# Patient Record
(demographics unavailable — no encounter records)

---

## 2017-02-03 NOTE — STRESS TEST
PROCEDURE PHYSICIAN:   ADRIANNA PRUETT

 

PHARMACOLOGIC NUCLEAR STRESS TEST REPORT 

 

DATE OF PROCEDURE:  

02/02/2017

 

INDICATION: 

Chest pain, hypertension, shortness of breath

 

PROCEDURE DETAILS:

The patient was brought to the stress lab after informed consent

was taken. Pharmacological nuclear stress test was performed

according to the protocol. Lexiscan 0.4 mg was given according to

the protocol. The patient had sinus rhythm at 82 bpm. Blood

pressure was 131/78 mmHg.  A low grade exercise was performed

during Lexiscan infusion. Maximum heart rate was 106 bpm. Blood

pressure was 140/76 mmHg.  The patient did not have any chest

pain, arrhythmias, ST-T wave abnormalities. The stress test was

stopped secondary to completion of protocol. Radionuclear isotope

was given at peak vasodilatation. 10.62 mCi of Myoview were given

for rest images and 30.1 mCi of Myoview were given for stress

imaging. Review of myocardial perfusion imaging shows TID of

0.92. Ejection fraction is 66%. End-diastolic volume was 70 mL

and end-systolic volume of 24 mL.  The patient has a large

moderate intensity inferior apical reversible defect. On gaited

images shows normal wall motion. Some stress score is 4 and some

differential score is 4. 

 

CONCLUSION:

1.   Pharmacological nuclear stress test is negative for

ischemia. 

2.   There is evidence of anterior apical ischemia.  

3.   Clinical correlation is recommended. 

 

 

 

Job ID: [id number]

Dictated Date: 02/02/2017 15:24:05 

Transcription Date: 02/03/2017 10:16:16 / cori

## 2017-02-03 NOTE — ECHOCARDIOGRAPHY REPORT
PROCEDURE PHYSICIAN:   ADRIANNA PRUETT

 

DATE OF PROCEDURE:  

02/02/2017

 

TWO DIMENSIONAL ECHOCARDIOGRAM REPORT 

 

PRIMARY PHYSICIAN:       Dr. Shaikh

OTHER PHYSICIAN:         

REFERRING PHYSICIAN:          

ORDERING PHYSICIAN:      

ATTENDING PHYSICIAN:     

FAMILY PHYSICIAN:        

READING PHYSICIAN:            

 

INDICATION FOR THE PROCEDURE:  Chest pain, hypertension,

palpitations, shortness of breath

 

MEASUREMENTS                  DERIVED VALUES 

 

LV DIAMETER (LAX)   NORMALS                       NORMALS

Diastolic           (3.6-5.2)      Eject. Fract.  (60%+/-6%)    

  

Systolic            (2.3-3.9)      Diastolic Vol.      

% Shortening        (0.22-0.42)    Systolic Vol.       

                                   Aortic Root         

IVS THICKNESS 

Diastolic           (0.6-1.1)

 

LVPW THICKNESS 

Diastolic           (0.6-1.1)

 

LA DIAMETER 

Systolic            (2.1-3.7)  

 

FINDINGS:

1.   Sinus rhythm. 

2.   Left atrial size is normal. Left atrial diameter is 3.6 cm. 

3.   Aortic root dimensions are normal. 

4.   LV systolic function is preserved. LV ejection fraction is

60 to 65%. Mild concentric LVH is noted with diastolic

intraventricular septal diameter of 1.1 cm. 

5.   There is no wall motion abnormalities. 

6.   Right heart dimensions are normal. Right ventricular

systolic function is preserved. 

7.   There is no evidence of pericardial effusion. 

8.   Diastolic function is normal. 

9.   IVC is enlarged with a diameter of 2.0 centimeters with

normal respiratory variation. 

 

VALVULAR STRUCTURE OF THE HEART:  

 

The mitral valve leaflets are mildly thickened with mild mitral

regurgitation. 

There is mild tricuspid regurgitation with RVSP of 27 mmHg. 

The pulmonic valve is within normal limits. 

There is mild aortic sclerosis noted with mild aortic

regurgitation. Pressure half time is 531. 

 

CONCLUSION:

1.   LV and RV size and function is normal. 

2.   LV EF is normal. 

3.   There is no significant valvular heart disease. 

4.   PA pressure is borderline elevated. 

 

 

 

Job ID: [id number]

Dictated Date: 02/02/2017 15:01:36 

Transcription Date: 02/03/2017 09:59:43 / cori

## 2017-02-07 NOTE — PROGRESS NOTE-POST OPERATIVE
Post-Operative Progess Note


Pre-Operative Diagnosis


chest pain, risk factor for cad, abnormal EKG, abnormal MPI





Post-Operative Diagnosis





patent epicardial coronary arteries





Post-Op Procedure Note


Date of Procedure:  Feb 7, 2017


Name of Procedure:  


coronary angiography


Procedure Note/Findings


coronary angiography revealed patent epicardial coronary vessels.


Anesthesia Type


local anesthesia, conscious sedation


Estimated blood loss (mL):  10 mL


Packing:  


none


Specimen(s) collected


none








MICHELLE PRUETT MD Feb 7, 2017 5:17 pm

## 2017-02-07 NOTE — CARDIAC PROCEDURE NOTE-CS/ASA
Pre-Procedure Note


Pre-Op Procedure Note


H&P Reviewed


The H&P was reviewed, patient examined and no changes noted.


Date H&P Reviewed:  Feb 7, 2017


Time H&P Reviewed:  15:40





Conscious Sedation Pre-Proced


Time Reviewed:  15:40


ASA Class:  3











Airway Mallampati Classification: ([place] appropriate class) I.  II.  III,  IV


 


Lungs 


 


Heart 


 


 ASA score


 


 ASA 1: a normal healthy patient


 


 ASA 2:  a patient with a mild systemic disease (mid diabetes, controlled 

hypertension, obesity 


 


 ASA 3:  a patient with a severe systemic disease that limits activity  (angina

, COPD, prior Myocardial infarction)


 


 ASA 4:  a patient with an incapacitating disease that is a constant threat to 

life (CHF, renal failure)


 


 ASA 5:  a moribund patient not expected to survive 24 hrs.  (ruptured aneurysm)


 


 ASA 6:  a declared brain dead patient whose organs are being harvested.


 


 For emergent operations, add the letter E after the classification








Grade 1


Sedation Plan:  Analgesia, Amnesia, Plan communicated to team members, 

Discussed options with patient/fam, Discussed risks with patient/fam


Note


The patient is an appropriate candidate to undergo the planned procedure, 

sedation, and anesthesia.





The patient immediately re-assessed prior to indication.








MICHELLE PRUETT MD Feb 7, 2017 3:40 pm

## 2017-02-07 NOTE — CARDIOLOGY DISCHARGE SUMMARY
Diagnosis/Chief Complaint


Date of Admission


2/7/2017


Date of Discharge


2/7/2017


Admission Diagnosis


chest pain, abnormal nuclear stress test





Final/Discharge Diagnosis


patent epicardial coronary arteries





Chief Complaint/HPI


Chief Complaint/HPI


chest pain, abnormal EKG,  and abnormal myocardial perfusion imaging, risk 

factors for coronary artery disease.





Discharge Summary


Procedures


coronary angiography


Discharge Physical Examination


stable





Hospital Course


stable


Pending Labs


Laboratory Tests


2/7/17 13:51: 


Activated Partial Thromboplast Time 25, Alanine Aminotransferase (ALT/SGPT) 30, 

Albumin 4.3, Alkaline Phosphatase 63, Anion Gap 7, Aspartate Amino Transf (AST/

SGOT) 19, BUN/Creatinine Ratio 14, Blood Urea Nitrogen 12, Calcium Level 8.9, 

Carbon Dioxide Level 28, Chloride Level 101, Creatinine 0.86, Estimat 

Glomerular Filtration Rate > 60, Glucose Level 102, Hematocrit 39, Hemoglobin 

13.5, INR Comment 1.0, Mean Corpuscular Hemoglobin 33, Mean Corpuscular 

Hemoglobin Concent 35, Mean Corpuscular Volume 95, Mean Platelet Volume 9.7, 

Platelet Count 314, Potassium Level 3.7, Prothrombin Time 12.7, Red Blood Count 

4.11, Red Cell Distribution Width 12.6, Sodium Level 136, Total Bilirubin 0.4, 

Total Protein 6.7, White Blood Count 10.6





Discussion & Recommendations


Discussion


patent epicardial coronary vessels.  Continue primary prevention measures.  

Continue treatment for hypertension and hyperlipidemia.


Follow up appt.:  


Dr. Copeland in 2-3 weeks


Dicharge Diet:  Cardiac Diet


Activity as Tolerated:  Yes (can go back to work on coming Monday)


Home Medications


Reviewed patient Home Medication Reconciliation Form





Discharge


Home Medications:


Reviewed and agree with Discharge Medication list on patient's Discharge 

Instruction sheet


Condition at discharge


stable


Instructions to patient/family


Dr Copeland in 2-3 weeks








MICHELLE COPELAND MD Feb 7, 2017 5:21 pm

## 2017-02-07 NOTE — DISCHARGE INST-POST CATH
Discharge Inst-CATH


Post Cardiac Cath D/C Inst


Follow Up/Plan


Dr Copeland in 2-3 weeks


CARDIAC CATH DISCHARGE INSTRUCTIONS





*Hold Metformin for 48 hours post heart cath.





ACTIVITY





* Go Home directly and rest.


* Limit activity of the leg (or wrist if it was used) for 7 days including 

aerobics, swimming,


   jogging, bicycling, etc.


* Restrict stair-climbing for 7 days if possible, if not, climb up with your non

-cath leg, then


   bring together on the same step.


* Avoid lifting, pushing, pulling or excessive movement of the affected 

extremity for 7 days.


* Customary sexual activity may be resumed after 2 days-use caution not to use 

a position  


   that strains or causes pain to the affected extremity.


* No driving for 24 hours.


* NO SMOKING. 


* Avoid straining for bowel movements for 7 days.


* Gentle walking on level ground is allowed.


* Returning to work will depend on the type of procedure and the results. Your 

doctor will discuss


   this with you.





CALL YOUR DOCTOR FOR ANY OF THE FOLLOWING:





*If bleeding from the puncture site occurs- Apply gentle pressure to site with 

clean cloth and call


   your doctor or EMS.


* If a knot or lump forms under the skin, increases in size, or causes pain.


* If bruising appears to be worsening or moving further down your leg instead 

of disappearing.


* Temperature above 101 F.





CARE OF YOUR GROIN INCISION;





* Bruising or purple discoloration of the skin near the puncture site is common.


* You may shower only, no bathtub bathing for 5 days.  Be careful to avoid 

slipping as your


   leg may feel stiff.


* If a closure device was used on your femoral artery, please see the attached 

guide regarding


   care of the device and your leg.


* REMOVE the dressing from your groin the next day after your procedure in the 

shower.





CARE OF YOUR WRIST INCISION;





* Bruising or purple discoloration of the skin near the puncture site is common.


* You may shower.


* DO NOT submerge wrist.


* Remove dressing in 24 hours.








MICHELLE COPELAND MD Feb 7, 2017 5:18 pm

## 2017-02-08 NOTE — CARDIAC CATHETERIZATION
PROCEDURE PHYSICIAN:   ADRIANNA PRUETT

 

DATE OF PROCEDURE:  

02/07/2017

 

INDICATION:

1.   Chest pain.

2.   EKG changes.

3.   Abnormal nuclear stress test. 

 

PREOPERATIVE DIAGNOSIS:

1.   Chest pain.

2.   Risk factors for coronary artery disease. 

3.   Abnormal EKG. 

4.   Abnormal nuclear stress test. 

 

POSTOPERATIVE DIAGNOSIS:

Patent epicardial coronary disease. 

 

HISTORY:

This is a 44-year-old lady with history of hypertension and

hyperlipidemia. She presents with recurrent chest pain. EKG shows

Q waves in the lateral leads, as well as abnormal R wave progression

in the anterior precordial leads. No ST-T wave abnormalities were

noted. Nuclear myocardial perfusion imaging was recommended which

showed at least a moderate sized reversible defect in the

anterior interior apical wall. Therefore coronary angiography was

recommended. 

 

PROCEDURE PERFORMED:

Coronary angiography. 

 

SPECIMENS:

None. 

 

COMPLICATIONS:

None. 

 

ESTIMATED BLOOD LOSS:

And 10 mL. 

 

CONTRAST:  36 mL of Omnipaque  

 

FLUOROSCOPY TIME:   5.8 minutes

 

FLUOROSCOPY DOSE:   553 mGy. 

 

PROCEDURE DETAILS:

The patient was brought to the Cath Lab after informed consent

was taken. She was  draped and prepped in the usual sterile

fashion.  Access was gained in the right radial artery with a 6

Chinese sheath.  Coronary angiography was performed with a Alban

catheter. 

 

FINDINGS:

1.   Left main:  Patent. 

2.   LAD: Patent; it is a transapical vessel. 

3.   Left circumflex artery: Patent. 

4.   RCA:  Patent. 

5.   Left heart catheterization was not performed. 

 

IMPRESSION/RECOMMENDATION: 

1.   Patent epicardial coronary vessels. 

2.   Continue primary prevention measures. 

 

 

 

Job ID: [id number]

Dictated Date: 02/07/2017 17:14:10 

Transcription Date: 02/08/2017 13:43:46 / cori [place]

## 2018-01-08 NOTE — DIAGNOSTIC IMAGING REPORT
Indication: Abnormal mammogram.



Technique: Limited real-time grayscale images were obtained over

the inferior medial left breast in various projections.



Findings: In the 7:30 position of the left breast 7 cm from the

nipple immediately adjacent to the chest wall there is a simple

cyst measuring 1.2 x x 0.9 x 0.1 cm. This appears to likely

correspond with the mammographic abnormality. No other discrete

solid or cystic masses are appreciated.



IMPRESSION:

 Category 3 probably benign



ACR BI-RADS Category 3: Probably benign findings.





Simple cyst which appears to correspond with the mammographic

abnormality. Six-month followup is recommended to ensure

stability of both lesions as well to ensure they correspond to

the same lesion.



Dictated by: 



  Dictated on workstation # [id number]

## 2018-01-08 NOTE — DIAGNOSTIC IMAGING REPORT
INDICATION: 

Abnormal screening mammogram.



COMPARISON:  

12/29/2017.



TECHNIQUE:  

Spot compression views and a true lateral view of the left breast

were obtained. The current study was also evaluated with a

Computer Aided Detection (CAD) system.



FINDINGS:

There is a persistent round density adjacent to the pectoralis

muscle at approximately the 7 o'clock position of the left

breast.



Ultrasound of this area demonstrates a benign-appearing cyst in

this exact location. The ultrasound is otherwise unremarkable.



IMPRESSION:   

The nodular density in the left breast appears to correspond with

a cyst in the left breast. An additional 6 month followup is

recommended to ensure stability.



ACR BI-RADS Category 3: Probably benign findings.

Result letter will be mailed to the patient.

Note: At least 10% of breast cancer is not imaged by mammography.



Dictated by: 



  Dictated on workstation # [id number]

## 2020-01-20 NOTE — DIAGNOSTIC IMAGING REPORT
INDICATION: 

Routine screening.



COMPARISON: 

12/29/2017 and 11/23/2015.



TECHNIQUE: 

2D and 3D bilateral screening mammography was performed with CAD.



FINDINGS:

Scattered fibroglandular densities are identified bilaterally.

There are benign calcifications. No dominant mass or malignant

appearing microcalcifications are seen. The axillae are

unremarkable.



IMPRESSION: 

No mammographic features suspicious for malignancy are

identified.



ACR BI-RADS Category 2: Benign findings.

Result letter will be mailed to the patient.

Note: At least 10% of breast cancer is not imaged by mammography.



Dictated by: 



  Dictated on workstation # [id number]